# Patient Record
(demographics unavailable — no encounter records)

---

## 2024-10-07 NOTE — PHYSICAL EXAM
[FreeTextEntry1] :  General: Cooperative, NAD HEENT: NC/AT, no carotid bruits Lungs: CTAB Chest: RRR, no murmurs Extremities: nontender, no erythema Neurological Examination: MS: AOx3. Appropriately interactive, normal affect. Speech fluent w/o paraphasic errors CN: No papilledema, PERLL, EOMI, V1-3 sensation intact, face symmetric, hearing intact, palate elevates symmetrically, tongue midline, SCM equal bilaterally Motor: normal bulk and tone, no tremor, rigidity or bradykinesia.  5/5 all over Sens: Intact to light touch. Reflexes: 2/4 all over, downgoing toes b/l Coord:  No dysmetria, NIKIA intact Gait: Normal

## 2024-10-07 NOTE — ASSESSMENT
[FreeTextEntry1] : 21 y/o Transgender male with hx of migraines, hx of benign intracranial hypertension, hx of seizures, hx of asthma, depression, here for evaluation of increasing frequency of migraines.  Migraines were well-controlled but noticing mild increase in frequency.  Topiramate increase has not helped and is increasing anxiety.  Would optimally like to control on one agent to prevent adverse effects. Will try prophylactic dose of Nurtec with goal of weaning off Topiramate.    PLAN: - No need for neuroimaging at this time - Increase Nurtec 75mg to every other day - Decrease Topiramate to 50mg BID. If HAs are better on Nurtec, will consider reducing by 50/week.  - Zofran 4mg ODT prn nausea - Magnesium Glycinate 400mg qhs - HA diary  f/u in 3 months

## 2024-10-07 NOTE — HISTORY OF PRESENT ILLNESS
[FreeTextEntry1] :  [facility] NEUROLOGY AT [place]  CC: Headache.   HPI: 21 y/o Transgender male with hx of migraines, hx of benign intracranial hypertension, hx of seizures, hx of asthma, depression, here for follow-up of headaches.   Prior neuro Hx:  Per record:  Patient previously followed with [facility] neurology, last seen in 2023.  Per notes patient has a hx of migraines and one episode of seizure.  "He has less headaches, ~ one migraine headaches every 2-3 months; milder headaches once a month. For milder headaches, he sometimes need to take tylenol or aleve. No further prolonged headache that needed ER visit ( since December 2020). Continued on Topamax 50 mg BID for prevention Rizatriptan relieves the migraine headaches. He has history of migraine headaches since he was in 7th or 8th grade. Headaches are with nausea, relieved by Excedrin and rest. Migraine headaches occur 1-2x/month   Onset of headache: 2016   Previous Imaging: yes. MRI/ MRV of the brain: September 2019. Headache Description   Location: frontal   Pain description: pounding   Frequency: once times per month   Intensity: a current pain level of 7/10, an average pain level of 7/10, a minimum pain level of 6/10 and a maximum pain level of 9/10   Associated Symptoms: phonophobia, nausea and photophobia   Aura: No Sleep History: On weekdays, sleeps at 10-12 and wakes up at 7-9.  No further seizure ( since the one in June 2020),   Seizure Hx:  in June 2020: He was camping with friends in [place] over the weekend, after camping on second full day in the sun, he had an episode of possible seizure,He was mostly drinking caffeinated drinks like Monster energy drinks , not drinking plain water; he also admits to smoke weed/marijuana few hours prior ( second time he did ); as per friend's report, he fell down while walking on the beach and had few seconds of eyes blinking and twitching of extremities; there were conflicting reports of whether he had another brief episode in the ambulance or at [facility].  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 -- admitted to [facility] September 23-27, 2019 for benign intracranial hypertension He presented with a 1 week history of headache, lightheadedness, and 4 days of diplopia and blurry vision. He was transitioned from Wellbutrin and Zoloft to Effexor over the past month. He had been on retinoic acid for at least a year, minocycline recently prescribed  one month prior. Also was on testosterone hormone therapy.  CT head -negative Ophthalmologist in ER noted b/l optic disc elevation. MRI and MRV of brain: normal except for pituitary microadenoma; small amount of retrocerebellar fluid LP; opening pressure:50; Closing pressure: 20; CSF studies- normal started on Diamox 500 mg BID and taken off Minocycline and retinoic acid    8/2/2024:  Currently has a migraine for past 4 days. Has had milder headaches this past month.  This is increased from his usual frequency. No real changes. Rizatriptan used to help with prior migraines.  Has tried rizatriptan last few days and gave mild improvement only. Currently on Topiramate 50mg BID.   Has tried BB in past which did not help.  No known triggers. Mother and sister with migraines. Nurtec has helped in the past as he tried his mother's    Today 10/7/2024: Given medrol dose pack at last visit and Nurtec prn.  Also increased Topiramate to 75mg BID.  Current migraine frequency once/week.  DICK relieved with Nurtec. Noticed with increased Topiramate dose that he was becoming more anxious. Does take weekly testosterone injections so not sure if headaches are correlating to this.  Has tried Beta blockers in the past and lexapro in the past--which did not help.

## 2025-01-06 NOTE — ASSESSMENT
[FreeTextEntry1] : 23 y/o Transgender male with hx of migraines, hx of benign intracranial hypertension, hx of seizures, hx of asthma, depression, here for evaluation of increasing frequency of migraines.  Migraines were well-controlled but noticing mild increase in frequency.  Topiramate increase has not helped and is increasing anxiety.  Would optimally like to control on one agent to prevent adverse effects. Doing well on prophylactic dose of Nurtec. Will wean off Topiramate.    PLAN: - No need for neuroimaging at this time - Cont. Nurtec 75mg to every other day - Wean Topiramate by 25mg/week.   - Zofran 4mg ODT prn nausea - Magnesium Glycinate 400mg qhs - HA diary -Cont. Therapist and psychiatry f/u  f/u in 3 months

## 2025-01-06 NOTE — HISTORY OF PRESENT ILLNESS
[FreeTextEntry1] :  [facility] NEUROLOGY AT [place]  CC: Headache.   HPI: 23 y/o Transgender male with hx of migraines, hx of benign intracranial hypertension, hx of seizures, hx of asthma, depression, here for follow-up of headaches.   10/7/24:  Prior neuro Hx:  Per record:  Patient previously followed with [facility] neurology, last seen in 2023.  Per notes patient has a hx of migraines and one episode of seizure.  "He has less headaches, ~ one migraine headaches every 2-3 months; milder headaches once a month. For milder headaches, he sometimes need to take tylenol or aleve. No further prolonged headache that needed ER visit ( since December 2020). Continued on Topamax 50 mg BID for prevention Rizatriptan relieves the migraine headaches. He has history of migraine headaches since he was in 7th or 8th grade. Headaches are with nausea, relieved by Excedrin and rest. Migraine headaches occur 1-2x/month   Onset of headache: 2016   Previous Imaging: yes. MRI/ MRV of the brain: September 2019. Headache Description   Location: frontal   Pain description: pounding   Frequency: once times per month   Intensity: a current pain level of 7/10, an average pain level of 7/10, a minimum pain level of 6/10 and a maximum pain level of 9/10   Associated Symptoms: phonophobia, nausea and photophobia   Aura: No Sleep History: On weekdays, sleeps at 10-12 and wakes up at 7-9.  No further seizure ( since the one in June 2020),   Seizure Hx:  in June 2020: He was camping with friends in [place] over the weekend, after camping on second full day in the sun, he had an episode of possible seizure,He was mostly drinking caffeinated drinks like Monster energy drinks , not drinking plain water; he also admits to smoke weed/marijuana few hours prior ( second time he did ); as per friend's report, he fell down while walking on the beach and had few seconds of eyes blinking and twitching of extremities; there were conflicting reports of whether he had another brief episode in the ambulance or at [facility].  Records from [facility] reviewed and scanned in chart: Normal CBC, CMP except for slightly low potassium, received IV potassium , IVF 1 liter admitted for 24 hours from June 13-14, 2020 EKG- normal; 45 minutes of EEG- normal CT and MRI of the brain- matthew cisterna magna or arachnoid cyst in posterior fossa Topamax dose increased from 50 mg Hs to 100 mg Hs Effexor ( venlafaxine) held x 1 day and Psychiatrist was planning to switch him over to Pristiq( Desvenlafaxine) anyway  Also has a prior hx of IIH. -- admitted to [facility] September 23-27, 2019 for benign intracranial hypertension He presented with a 1 week history of headache, lightheadedness, and 4 days of diplopia and blurry vision. He was transitioned from Wellbutrin and Zoloft to Effexor over the past month. He had been on retinoic acid for at least a year, minocycline recently prescribed one month prior. Also was on testosterone hormone therapy.  CT head -negative Ophthalmologist in ER noted b/l optic disc elevation. MRI and MRV of brain: normal except for pituitary microadenoma; small amount of retrocerebellar fluid LP; opening pressure:50; Closing pressure: 20; CSF studies- normal started on Diamox 500 mg BID and taken off Minocycline and retinoic acid   8/2/2024:  Currently has a migraine for past 4 days. Has had milder headaches this past month.  This is increased from his usual frequency. No real changes. Rizatriptan used to help with prior migraines.  Has tried rizatriptan last few days and gave mild improvement only. Currently on Topiramate 50mg BID.   Has tried BB in past which did not help.  No known triggers. Mother and sister with migraines. Nurtec has helped in the past as he tried his mother's   10/7/2024: Given medrol dose pack at last visit and Nurtec prn.  Also increased Topiramate to 75mg BID.  Current migraine frequency once/week.  DICK relieved with Nurtec. Noticed with increased Topiramate dose that he was becoming more anxious. Does take weekly testosterone injections so not sure if headaches are correlating to this.  Has tried Beta blockers in the past and lexapro in the past--which did not help.    Today 1/6/25: Decreased topamax to 50mg BID and changed Nurtec to prophylactic dose.  States headaches seem improved in past few weeks.  Joined an inpatient therapy program.

## 2025-04-07 NOTE — HISTORY OF PRESENT ILLNESS
[FreeTextEntry1] :  [facility] NEUROLOGY AT [place]  CC: Headache.   HPI: 21 y/o Transgender male with hx of migraines, hx of benign intracranial hypertension, hx of seizures, hx of asthma, depression, here for follow-up of headaches.   10/7/24:  Prior neuro Hx:  Per record:  Patient previously followed with [facility] neurology, last seen in 2023.  Per notes patient has a hx of migraines and one episode of seizure.  "He has less headaches, ~ one migraine headaches every 2-3 months; milder headaches once a month. For milder headaches, he sometimes need to take tylenol or aleve. No further prolonged headache that needed ER visit ( since December 2020). Continued on Topamax 50 mg BID for prevention Rizatriptan relieves the migraine headaches. He has history of migraine headaches since he was in 7th or 8th grade. Headaches are with nausea, relieved by Excedrin and rest. Migraine headaches occur 1-2x/month   Onset of headache: 2016   Previous Imaging: yes. MRI/ MRV of the brain: September 2019. Headache Description   Location: frontal   Pain description: pounding   Frequency: once times per month   Intensity: a current pain level of 7/10, an average pain level of 7/10, a minimum pain level of 6/10 and a maximum pain level of 9/10   Associated Symptoms: phonophobia, nausea and photophobia   Aura: No Sleep History: On weekdays, sleeps at 10-12 and wakes up at 7-9.  No further seizure ( since the one in June 2020),   Seizure Hx:  in June 2020: He was camping with friends in [place] over the weekend, after camping on second full day in the sun, he had an episode of possible seizure,He was mostly drinking caffeinated drinks like Monster energy drinks , not drinking plain water; he also admits to smoke weed/marijuana few hours prior ( second time he did ); as per friend's report, he fell down while walking on the beach and had few seconds of eyes blinking and twitching of extremities; there were conflicting reports of whether he had another brief episode in the ambulance or at [facility].  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 -- [place] [facility] [place] for benign intracranial hypertension He presented with a 1 week history of headache, lightheadedness, and 4 days of diplopia and blurry vision. He was transitioned from Wellbutrin and Zoloft to Effexor over the past month. He had been on retinoic acid for at least a year, minocycline recently prescribed one month prior. Also was on testosterone hormone therapy.  CT head -negative Ophthalmologist in ER noted b/l optic disc elevation. MRI and MRV of brain: normal except for pituitary microadenoma; small amount of retrocerebellar fluid LP; opening pressure:50; Closing pressure: 20; CSF studies- normal started on Diamox 500 mg BID and taken off Minocycline and retinoic acid   8/2/2024:  Currently has a migraine for past 4 days. Has had milder headaches this past month.  This is increased from his usual frequency. No real changes. Rizatriptan used to help with prior migraines.  Has tried rizatriptan last few days and gave mild improvement only. Currently on Topiramate 50mg BID.   Has tried BB in past which did not help.  No known triggers. Mother and sister with migraines. Nurtec has helped in the past as he tried his mother's   10/7/2024: Given medrol dose pack at last visit and Nurtec prn.  Also increased Topiramate to 75mg BID.  Current migraine frequency once/week.  DICK relieved with Nurtec. Noticed with increased Topiramate dose that he was becoming more anxious. Does take weekly testosterone injections so not sure if headaches are correlating to this.  Has tried Beta blockers in the past and lexapro in the past--which did not help.    1/6/25: Decreased topamax to 50mg BID and changed Nurtec to prophylactic dose.  States headaches seem improved in past few weeks.  Joined an inpatient therapy program.    Today 4/7/25: On Prophylactic dose of Nurtec.  Doing better, current HA frequency is 1-2/month.

## 2025-04-07 NOTE — ASSESSMENT
[FreeTextEntry1] : 23 y/o Transgender male with hx of migraines, hx of benign intracranial hypertension, hx of seizures, hx of asthma, depression, here for evaluation of increasing frequency of migraines.  Migraines were well-controlled but noticing mild increase in frequency.  Topiramate increase has not helped and is increasing anxiety.  Would optimally like to control on one agent to prevent adverse effects. Doing well on prophylactic dose of Nurtec. On topiramate for mood stabilization.   PLAN: - No need for neuroimaging at this time - Cont. Nurtec 75mg every other day - On Topiramate for mood stabilization per Psych - Zofran 4mg ODT prn nausea - Magnesium Glycinate 400mg qhs - HA diary -Cont. Therapist and psychiatry f/u  f/u in 6 mos

## 2025-05-14 NOTE — DATA REVIEWED
[FreeTextEntry1] : Labs 11/2023: Testosterone 489 CMP normal except Cr 1.32 A1c 5% TSH 2.36  Labs 5/2023: Hb 14.8 Testosterone 462.0 CMP normal except glucose of 100  Labs 8/2022: CBC normal CMP normal except glucose of 101  Chol 152 HDL 38 LDL 83 Testosterone 358 Prolactin 36.1 Monomeric 25.4  Labs 12/2021: Testosterone 490 Hb 15.5 CMP normal except glucose of 103 Monomeric Prolactin 24  Labs 7/2021: Testosterone 524 Estradiol 27 A1c 5%

## 2025-05-14 NOTE — REVIEW OF SYSTEMS
[Back Pain] : back pain [Headaches] : headaches [Depression] : depression [Anxiety] : anxiety [All other systems negative] : All other systems negative [Fatigue] : no fatigue [Recent Weight Gain (___ Lbs)] : no recent weight gain [Recent Weight Loss (___ Lbs)] : no recent weight loss [Visual Field Defect] : no visual field defect [Dysphagia] : no dysphagia [Dysphonia] : no dysphonia [Chest Pain] : no chest pain [Palpitations] : no palpitations [Shortness Of Breath] : no shortness of breath [Nausea] : no nausea [Constipation] : no constipation [Vomiting] : no vomiting [Diarrhea] : no diarrhea [Polyuria] : no polyuria [Joint Pain] : no joint pain [FreeTextEntry9] : +chronic back pain from scoliosis [de-identified] : +migraines

## 2025-05-14 NOTE — ASSESSMENT
[FreeTextEntry1] : 22 y/o trans male on gender affirming masculinizing hormone therapy. Discussed hormone affirming treatment for masculinization with testosterone. Reviewed various formulations of testosterone. Pt. prefers injections. Check levels on testosterone cypionate 0.4ml of 200mg/ml every week. Risks and benefits of testosterone described including polycythemia and liver effects. Reviewed goal testosterone level of 300-400 as trough and 700-800 as peak level. Mid-range ideally should be 500-600 for cis-gender mid male range. Will check level today as well as SHBG, CBC, CMP, lipids. GYN follow-up recommended. No plans for reproductive assistance. Will needs age-appropriate cancer screening in the future such as mammogram, colonoscopy, pap. s/p top surgery with revision 2/2023. No plans for bottom surgery at this time.  Hyperprolactinemia mild, likely secondary to risperdal, assess for changes in symptoms.  Prep time with review of labs and interval progress notes and consultations Discussion with patient regarding hormone regimen with management plan, risks and benefits, treatment options and goals of care answering all patients questions and addressing all concerns Post-visit completion charting and review Total Time 30 min  Specifically causes, evaluation, treatment options, risks, complications, and benefits of available therapies were discussed. Questions were answered.  The submitted E/M billing level for this visit reflects the total time spent on the day of the visit including face-to-face time spent with the patient, non-face-to-face review of medical records and relevant information, documentation, and asynchronous communication with the patient after a visit via phone, email, or patients EHR portal after the visit.  The medical records reviewed are either scanned into the chart or reviewed with the patient using a patients electronic medical records portal for patients with records not available to [facility] via electronic transmission platforms from other institutions and labs.  Time spend counseling and performing coordination of care was also included in determining the appropriate EM billing level.  I have reviewed and verified information regarding the chief complaint and history recorded by the ancillary staff and/or the patient. I have independently reviewed and interpreted tests performed by other physicians and facilities as necessary.   I have discussed with the patient differential diagnosis, reason for auxiliary tests if ordered, risks, benefits, alternatives, and complications of each form of therapy were discussed.

## 2025-05-14 NOTE — PHYSICAL EXAM
[Alert] : alert [Well Nourished] : well nourished [Healthy Appearance] : healthy appearance [No Acute Distress] : no acute distress [No Proptosis] : no proptosis [Thyroid Not Enlarged] : the thyroid was not enlarged [No Thyroid Nodules] : no palpable thyroid nodules [No Respiratory Distress] : no respiratory distress [No Accessory Muscle Use] : no accessory muscle use [Normal Rate and Effort] : normal respiratory rate and effort [Clear to Auscultation] : lungs were clear to auscultation bilaterally [Normal S1, S2] : normal S1 and S2 [Normal Rate] : heart rate was normal [Regular Rhythm] : with a regular rhythm [Normal Bowel Sounds] : normal bowel sounds [Not Tender] : non-tender [Not Distended] : not distended [Soft] : abdomen soft [No Stigmata of Cushings Syndrome] : no stigmata of Cushings Syndrome [No Clubbing, Cyanosis] : no clubbing  or cyanosis of the fingernails [No Involuntary Movements] : no involuntary movements were seen [Oriented x3] : oriented to person, place, and time [Normal Affect] : the affect was normal [Normal Mood] : the mood was normal [Acanthosis Nigricans] : no acanthosis nigricans

## 2025-05-14 NOTE — HISTORY OF PRESENT ILLNESS
[FreeTextEntry1] : Baldomero (legal name and gender marker changed) is a now 23 year old transgender male here for cross sex hormone treatment.  Preferred Pronouns: He/Him  Baldomero came out at the beginning of high school to his family; they were hesitant at first but soon came around and are all very supportive of him. He lives at home with his parents and sister. He is out at school and is supported there. He also has supportive friends and a supportive boyfriend.  Seen initially by Dr. Elias 11/8/2018 visit to re-start cross sex hormone treatment. He was came out as transgender October 2016, was place on high dosage of testosterone by Dr. Cheney after which he was admitted for depression and testosterone was held. After discharge and stabilizing he was following with therapist and psychiatrist regularly and cleared to restart cross sex hormone after meeting with Dr. Ram at [facility]. He restarted testosterone 11/19/18 increased steadily to goal now on 0.4ml weekly. Last dose 10 days ago  Injects IM in thigh, rotates sites. No issues with injections. Since starting testosterone has noticed deeper voice, amenorrhea, no spotting, cramping or bleeding, thicker hair on body and increased facial hair, bottom growth.   s/p top surgery 5/2022 with Dr. Garcia. previously was binding with cristel. s/p top surgery revision 2/14/23. Happy with results.  No interest in bottom surgery. No hx of oligomenorrhea. Seen by GYN in 2023 No interest in egg freezing No hx of thromboembolic disease or liver disorders No chest pain, SOB, palpitation, SOB, LE edema or calf pain.   Of note September 2019 he had papilledema due to pseudotumor cerebri likely due to retinoic acid used for his acne and this was stopped. Incidentally MRI showed small pituitary adenoma with mildly elevated prolactin (on risperdal). He has had long hx of migraine being followed by neurology.   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   He is still on Topamax for his migraines which is working.  He is going to [facility], Dr. Elder for psychiatrist and [facility] for therapist that is local to his high school.  He reports he is on: Lithium 900mg daily trazodone 150mg 1/day Topamax 50mg 2/day Lamotrigine 200mg 1/day Risperdal 1mg 1/day   Baldomero reports that he vapes with a juul every day. Alcohol use about once or twice a month, one or two drinks each time. +marijuana. No other drug use.